# Patient Record
Sex: FEMALE | Race: BLACK OR AFRICAN AMERICAN
[De-identification: names, ages, dates, MRNs, and addresses within clinical notes are randomized per-mention and may not be internally consistent; named-entity substitution may affect disease eponyms.]

---

## 2018-09-27 ENCOUNTER — HOSPITAL ENCOUNTER (OUTPATIENT)
Dept: HOSPITAL 89 - US | Age: 23
End: 2018-09-27
Attending: EMERGENCY MEDICINE
Payer: COMMERCIAL

## 2018-09-27 DIAGNOSIS — R10.9: Primary | ICD-10-CM

## 2018-09-27 PROCEDURE — 76705 ECHO EXAM OF ABDOMEN: CPT

## 2018-09-27 NOTE — RADIOLOGY IMAGING REPORT
FACILITY: [facility] 

 

PATIENT NAME: Guero Carmona

: 1995

MR: [id number]

V: [id number]

EXAM DATE: 

ORDERING PHYSICIAN: LAUREL MCKEON

TECHNOLOGIST: 

 

Location: [facility] [place]

Patient: Guero Carmona

: 1995

MRN: [id number]

Visit/Account:[id number]

Date of Sevice:  2018

 

ACCESSION #: [id number]

 

GALLBLADDER

 

HISTORY:  Right upper quadrant pain exacerbated by eating fatty foods

 

COMPARISON:  None.

 

FINDINGS:

 

Gallbladder: There is a tiny echogenic focus in the gallbladder neck only seen on one image and could
 represent an artifact due to the lack of reproducibility

 

Liver: Negative.

 

Common duct: Normal, 2.7 mm diameter.

 

Pancreas: Partially obscured by bowel, visualized aspects unremarkable.

 

Right kidney: Right kidney appears unremarkable measuring 10 cm in length

 

Upper abdominal aorta and IVC: Patent.

 

Ascites: None visualized.

 

IMPRESSION:

There is a tiny echogenic focus in the gallbladder neck which could not be reproduced and could be ar
tifactual in nature otherwise unremarkable right upper quadrant ultrasound

 

Report Dictated By: Catherine Pineda MD at 2018 10:37 AM

 

Report E-Signed By: Catherine Pineda MD  at 2018 10:38 AM

 

WSN:[id number]